# Patient Record
Sex: MALE | Race: WHITE | Employment: FULL TIME | URBAN - METROPOLITAN AREA
[De-identification: names, ages, dates, MRNs, and addresses within clinical notes are randomized per-mention and may not be internally consistent; named-entity substitution may affect disease eponyms.]

---

## 2020-07-14 ENCOUNTER — HOSPITAL ENCOUNTER (EMERGENCY)
Age: 40
Discharge: HOME | End: 2020-07-15
Attending: EMERGENCY MEDICINE
Payer: COMMERCIAL

## 2020-07-14 VITALS
OXYGEN SATURATION: 99 % | RESPIRATION RATE: 16 BRPM | WEIGHT: 195 LBS | SYSTOLIC BLOOD PRESSURE: 145 MMHG | HEART RATE: 82 BPM | HEIGHT: 70 IN | DIASTOLIC BLOOD PRESSURE: 79 MMHG | BODY MASS INDEX: 27.92 KG/M2 | TEMPERATURE: 97.8 F

## 2020-07-14 PROCEDURE — 99283 EMERGENCY DEPT VISIT LOW MDM: CPT

## 2020-07-14 SDOH — HEALTH STABILITY: MENTAL HEALTH: HOW OFTEN DO YOU HAVE A DRINK CONTAINING ALCOHOL?: NEVER

## 2020-07-14 ASSESSMENT — PAIN DESCRIPTION - ORIENTATION: ORIENTATION: LEFT

## 2020-07-14 ASSESSMENT — PAIN DESCRIPTION - LOCATION: LOCATION: KNEE

## 2020-07-14 ASSESSMENT — PAIN SCALES - GENERAL: PAINLEVEL_OUTOF10: 8

## 2020-07-15 ENCOUNTER — APPOINTMENT (OUTPATIENT)
Dept: GENERAL RADIOLOGY | Age: 40
End: 2020-07-15
Payer: COMMERCIAL

## 2020-07-15 PROCEDURE — 6360000002 HC RX W HCPCS: Performed by: PHYSICIAN ASSISTANT

## 2020-07-15 PROCEDURE — 73562 X-RAY EXAM OF KNEE 3: CPT

## 2020-07-15 PROCEDURE — 96372 THER/PROPH/DIAG INJ SC/IM: CPT

## 2020-07-15 RX ORDER — OXYCODONE HYDROCHLORIDE 5 MG/1
5 TABLET ORAL EVERY 6 HOURS PRN
Qty: 10 TABLET | Refills: 0 | Status: SHIPPED | OUTPATIENT
Start: 2020-07-15 | End: 2020-07-18

## 2020-07-15 RX ORDER — KETOROLAC TROMETHAMINE 30 MG/ML
15 INJECTION, SOLUTION INTRAMUSCULAR; INTRAVENOUS ONCE
Status: COMPLETED | OUTPATIENT
Start: 2020-07-15 | End: 2020-07-15

## 2020-07-15 RX ADMIN — KETOROLAC TROMETHAMINE 15 MG: 30 INJECTION, SOLUTION INTRAMUSCULAR at 00:36

## 2020-07-15 ASSESSMENT — PAIN SCALES - GENERAL
PAINLEVEL_OUTOF10: 8
PAINLEVEL_OUTOF10: 5

## 2020-07-15 NOTE — ED TRIAGE NOTES
Pt states that he was taking out the trash and twisted and felt a pop in the left knee. Unable to straighten leg, hx ACL repair 21 years ago.

## 2020-07-16 ENCOUNTER — OFFICE VISIT (OUTPATIENT)
Dept: ORTHOPEDIC SURGERY | Age: 40
End: 2020-07-16
Payer: COMMERCIAL

## 2020-07-16 VITALS — HEIGHT: 70 IN | WEIGHT: 195.11 LBS | BODY MASS INDEX: 27.93 KG/M2 | TEMPERATURE: 97.4 F

## 2020-07-16 PROCEDURE — 99203 OFFICE O/P NEW LOW 30 MIN: CPT | Performed by: ORTHOPAEDIC SURGERY

## 2020-07-16 PROCEDURE — MISC250 COMPRESSION STOCKING: Performed by: ORTHOPAEDIC SURGERY

## 2020-07-16 NOTE — PROGRESS NOTES
Review of Systems   Gastrointestinal:        Hemorrhoids     Musculoskeletal:        Fractures or other injuries    All other systems reviewed and are negative.

## 2020-07-16 NOTE — PROGRESS NOTES
Date:  2020    Name:  Darrell Lange  Address:  [address] [place] [address] [place] [address]    :  1980      Age:   35 y.o.    SSN:  [id number]      Medical Record Number:  <[id number]>    Reason for Visit:    Chief Complaint    New Patient (left knee )      DOS:2020     HPI: Darrell Lange is a 35 y.o. male here today for evaluation of left knee. Patient injured his left knee on Tuesday this past week. He states he was fully around and jumped up into the air and came down wrong on his left knee falling to the ground. He felt a pop in his left knee and had immediate pain and swelling. He went to [facility] ER where x-rays were taken. He was put into a knee immobilizer. He continues to have significant pain and swelling with difficulty extending the knee. The patient does have history of ACL reconstruction using hamstring autograft back in . He denies having any issues with his knee since that surgery. He states his most active activities include golfing and riding his Peloton at home. 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 He denies any paresthesias to his left lower extremity. Pain Assessment  Location of Pain: Knee  Location Modifiers: Left  Severity of Pain: 8  Quality of Pain: Aching  Duration of Pain: A few days  Frequency of Pain: Constant  Aggravating Factors: Other (Comment)(pivoting)  Relieving Factors: Ice, Rest, Nsaids  Result of Injury: Yes  Work-Related Injury: No  Are there other pain locations you wish to document?: No  ROS: Review of systems reviewed from Patient History Form completed today and available in the patient's chart under the Media tab. No past medical history on file. Past Surgical History:   Procedure Laterality Date    ANTERIOR CRUCIATE LIGAMENT REPAIR Left     SHOULDER SURGERY         No family history on file.     Social History     Socioeconomic History    Marital status: Single     Spouse name: None    Number of children: None    Years of education: None    Highest education level: None   Occupational History    None   Social Needs    Financial resource strain: None    Food insecurity     Worry: None     Inability: None    Transportation needs     Medical: None     Non-medical: None   Tobacco Use    Smoking status: Never Smoker    Smokeless tobacco: Never Used   Substance and Sexual Activity    Alcohol use: Never     Frequency: Never    Drug use: Never    Sexual activity: Yes   Lifestyle    Physical activity     Days per week: None     Minutes per session: None    Stress: None   Relationships    Social connections     Talks on phone: None     Gets together: None     Attends [employer] service: None     Active member of club or organization: None     Attends meetings of clubs or organizations: None     Relationship status: None    Intimate partner violence     Fear of current or ex partner: None     Emotionally abused: None     Physically abused: None     Forced sexual activity: None   Other Topics Concern    None   Social History Narrative    None       Current Outpatient Medications   Medication Sig Dispense Refill    oxyCODONE (ROXICODONE) 5 MG immediate release tablet Take 1 tablet by mouth every 6 hours as needed for Pain for up to 3 days. 10 tablet 0     No current facility-administered medications for this visit. No Known Allergies    Vital signs:  Temp 97.4 °F (36.3 °C) (Infrared)   Ht 5' 10\" (1.778 m)   Wt 195 lb 1.7 oz (88.5 kg)   BMI 27.99 kg/m²          Neuro: Alert & oriented x 3,  normal,  no focal deficits noted. Normal affect. Eyes: sclera clear  Ears: Normal external ear  Mouth:  No perioral lesions  Pulm: Respirations unlabored and regular  Pulse: Extremities well perfused. 2+ peripheral pulses. Skin: Warm. No ulcerations. Constitutional: The physical examination finds the patient to be well-developed and well-nourished.   The patient is alert and oriented x3 and was cooperative throughout the visit. Left knee exam    Gait: Patient in knee immobilizer. antalgic gait. Alignment: normal alignment. Inspection/skin: Skin is intact without erythema or ecchymosis. Significant swelling appreciated    Palpation: mild crepitus. Medial joint line tenderness present. Range of Motion: Limited range of motion secondary to pain and swelling with -10 degrees of extension and 40 degrees flexion    Strength: Normal quadriceps development. Effusion: Large effusion present    Ligamentous stability: No collateral ligament instability. Positive Lachman exam with soft endpoint    Neurologic and vascular: Skin is warm and well-perfused. Sensation is intact to light-touch. Special tests: Positive Van sign. Right knee exam    Gait: No use of assistive devices. No antalgic gait. Alignment: normal alignment. Inspection/skin: Skin is intact without erythema or ecchymosis. No gross deformity. Palpation: mild crepitus. no joint line tenderness present. Range of Motion: There is full range of motion. Strength: Normal quadriceps development. Effusion: No effusion or swelling present. Ligamentous stability: No cruciate or collateral ligament instability. Neurologic and vascular: Skin is warm and well-perfused. Sensation is intact to light-touch. Special tests: Negative Van sign. Diagnostics:  Radiology:     X-rays obtained from [facility] previous day include nonweightbearing AP, lateral and merchant views. There is evidence of previous ACL reconstruction with femoral and tibial screws present. No evidence of any acute fracture or malalignment. Assessment: 54-year-old male with acute left knee injury with concern for medial meniscus tear and ACL rupture. Patient has history of left knee ACL reconstruction back in 1995 with hamstring autograft. Plan: Pertinent imaging was reviewed.  The etiology, natural history, and treatment options for the disorder were discussed. The roles of activity medication, antiinflammatories, injections, bracing, physical therapy, and surgical interventions were all described to the patient and questions were answered. The patient is in agreement with this plan. All questions were answered to patient's satisfaction and was encouraged to call with any further questions. We discussed with the patient that we will have him continue to use knee immobilizer and would like him to ambulate with a crutch 50% weightbearing on left lower extremity. He is placed into thigh-high stockings today to help with swelling. He is encouraged to use oral anti-inflammatories as needed for pain control and swelling. We will get an MRI of his left knee to further evaluate the meniscus and ACL. We will see him back within 1 week following the MRI to discuss the results and future treatment plan. Orders Placed This Encounter   Procedures    Compression Stocking $30     Patient was supplied a Compression Sleeve. This retail item was supplied to provide functional support and assist in controlling swelling in the lower extremities. Verbal and written instructions for the use of and application of this item were provided. The patient was educated and fit by a healthcare professional with expert knowledge and specialization in brace application. They were instructed to contact the office immediately should the equipment result in increased pain, decreased sensation, increased swelling or worsening of the condition. Deborah Persaud DO  Clinical Fellow  [address]      The encounter was supervised by Dr Santi Ríos who personally examined the patient and reviewed the plan. This dictation was performed with a verbal recognition program (DRAGON) and it was checked for errors. It is possible that there are still dictated errors within this office note.   If so, please bring any errors to my attention for an addendum. All efforts were made to ensure that this office note is accurate. This dictation was performed with a verbal recognition program (DRAGON) and it was checked for errors. It is possible that there are still dictated errors within this office note. If so, please bring any errors to my attention for an addendum. All efforts were made to ensure that this office note is accurate. Supervising Physician Attestation:  I, Dr. Sabrina Hickey, personally performed the services described in this documentation as scribed above, and it is both accurate and complete and I agree with all pertinent clinical information. I personally interviewed the patient and performed a physical examination and medical decision making. I discussed the patient's condition and treatment options and have  reviewed and agree with the past medical, family and social history unless otherwise noted. All of the patient's questions were answered.       Board Certified Orthopaedic Surgeon  [facility] and [address] [place] [address] and [address] and [employer]  Professor of Vick W Mello Weaver

## 2020-07-21 ENCOUNTER — OFFICE VISIT (OUTPATIENT)
Dept: ORTHOPEDIC SURGERY | Age: 40
End: 2020-07-21
Payer: COMMERCIAL

## 2020-07-21 VITALS — HEIGHT: 70 IN | WEIGHT: 195.11 LBS | BODY MASS INDEX: 27.93 KG/M2 | TEMPERATURE: 98.2 F

## 2020-07-21 PROCEDURE — 99214 OFFICE O/P EST MOD 30 MIN: CPT | Performed by: ORTHOPAEDIC SURGERY

## 2020-07-21 NOTE — PROGRESS NOTES
[facility]  History and Physical  Knee Pain    Date:  2020    Name:  Jose Rees  Address:  [address] [place] [id number]    :  1980      Age:   35 y.o.    SSN:  [id number]      Medical Record Number:  <[id number]>      Chief Complaint    Follow-up (left knee, MRI results )      History of Present Illness:  Jose Rees is a 35 y.o. male who presents for follow-up evaluation of his left knee pain and review of his MRI results. To recap, he states he was fully around over a week ago and jumped up into the air and came down wrong on his left knee falling to the ground. He felt a pop in his left knee and had immediate pain and swelling. He went to [facility] ER where x-rays were taken. He was put into a knee immobilizer. He continues to have significant pain and swelling with difficulty extending the knee. The patient does have history of ACL reconstruction using hamstring autograft back in . Patient denied any issues with his knee prior to this injury. Patient states that he has been utilizing a knee immobilizer and crutches approximately 60% of the time. When not using them he notes increased left knee instability as well as pain in the medial and lateral joint line. Patient desires to return to golfing as well as riding his imbookin ([employer]) stationary bike at home. Otherwise his occupation requires him to sit at a desk for most of the day. The patient denies any new injury since his last visit. The patient denies any numbness, or paresthesias. Pain Assessment  Location of Pain: Knee  Location Modifiers: Left  Severity of Pain: 6  Quality of Pain: Aching  Duration of Pain: A few days  Frequency of Pain: Constant  Aggravating Factors: Other (Comment)(n/a)  Relieving Factors: Ice, Rest, Nsaids  Result of Injury: Yes  Work-Related Injury: No  Are there other pain locations you wish to document?: No    No past medical history on file. Past Surgical History:   Procedure Laterality Date    ANTERIOR CRUCIATE LIGAMENT REPAIR Left     SHOULDER SURGERY         No family history on file. Social History     Socioeconomic History    Marital status: Single     Spouse name: None    Number of children: None    Years of education: None    Highest education level: None   Occupational History    None   Social Needs    Financial resource strain: None    Food insecurity     Worry: None     Inability: None    Transportation needs     Medical: None     Non-medical: None   Tobacco Use    Smoking status: Never Smoker    Smokeless tobacco: Never Used   Substance and Sexual Activity    Alcohol use: Never     Frequency: Never    Drug use: Never    Sexual activity: Yes   Lifestyle    Physical activity     Days per week: None     Minutes per session: None    Stress: None   Relationships    Social connections     Talks on phone: None     Gets together: None     Attends Mandaeism service: None     Active member of club or organization: None     Attends meetings of clubs or organizations: None     Relationship status: None    Intimate partner violence     Fear of current or ex partner: None     Emotionally abused: None     Physically abused: None     Forced sexual activity: None   Other Topics Concern    None   Social History Narrative    None       No current outpatient medications on file. No current facility-administered medications for this visit. No Known Allergies      Review of Systems:  A 14 point review of systems was completed by the patient and is available in the media section of the scanned medical record and was reviewed on 7/21/2020. The review is negative with the exception of those things mentioned in the HPI and Past Medical History   Review of Systems   Musculoskeletal: Positive for arthralgias and myalgias. Neurological: Positive for weakness. Negative for numbness.           Vital Signs:   Temp 98.2 °F (36.8 °C) (Infrared)    5' 10\" (1.778 m)   Wt 195 lb 1.7 oz (88.5 kg)   BMI 27.99 kg/m²     General Exam:    General: Cresencio Cornelius is a healthy and well appearing 35y.o. year old male who is sitting comfortably in our office in no acute distress. Neuro: Alert & Oriented x 3,  normal,  no focal deficits noted. Normal mood & affect. Eyes: Sclera clear  Ears: Normal external ear  Mouth: Patient wearing a mask  Pulm: Respirations unlabored and regular   Skin: Warm, well perfused      Knee Examination: Left    Inspection: Mild quadricep atrophy noted. Mild effusion. No ecchymosis, discoloration, erythema, excessive warmth. no gross deformities, no signs of infection. Palpation: There is patellofemoral crepitus, there is no joint line tenderness. No other osseous or soft tissue tenderness around the knee. Negative calf tenderness    Range of Motion: 0-100 degrees     Strength: Grossly intact    Special Tests:  Valgus and varus stress test are stable at 0 and 30°. Lachman's exhibits a solid endpoint. Grade 2 pivot shift. Negative posterior drawer. Negative Homans sign    Gait: Steady, Non antalgic, with the use of crutches    Alignment: Neutral    Neuro: Sensation equal bilateral lower extremities    Vascular: 2+ posterior tibialis pulse        Radiology:     Narrative    [employer] Re #: [id number] #: [id number] Procedure: MR Left Knee w/o Contrast ; Reason for Exam: ACL and medial meniscus tear    This document is confidential medical information.  Unauthorized disclosure or use of this information is prohibited by law. If you are not the intended recipient of this document, please advise us by calling immediately [phone].         [employer] [address] [place], [place] [address]r Sahil              Patient Name: Cresencio Cornelius    Case ID: [id number]    Patient : 1980    Referring Physician: Domo Coto MD    Exam Date: 2020    Exam Description: MR Left Knee w/o Contrast           HISTORY:  ACL and medial meniscus tear.         TECHNICAL FACTORS:  Long- and short-axis fat- and water-weighted images were performed.         COMPARISON:  None.         FINDINGS:  Thickened inflamed MCL.  Sprain and capsulitis present.         Fibular collateral ligament is intact as is the conjoined tendon and insertion.         Contusion involves the posterolateral tibia and sulcus terminalis of the lateral femoral    condyle.         Lateral meniscus is intact.         ACL graft is torn.         PCL is slightly buckled.  No tear.         Medial meniscus demonstrates abnormal signal involving the outer third of the posterior horn.      This may be related to a healed red-red zone tear or repaired tear.  Recurrent tearing    suspected, and careful inspection and probing may be necessary to exclude a tear in this    region.         Small effusion present.         Scarring within Hoffa fat.         Bone island in the lateral femur.         Small hemarthrosis.         Contusion involves the medial femur as well.  No chondral defect or loose body within the    joint.  Nodular region of signal within the popliteus reflection measuring 6 x 4.5 x 5.5 mm.      Synovial or chondral body present.  Small body also noted along the posterolateral tibia.  This     is likely a sequelae of chronic fracture rather than a region of dystrophic mineralization.      It is best visualized on sagittal T1 image #7.  Chronic tear and scarring inferior    meniscopopliteal fascicle present.  If intervention is performed, correlation and probing in    this region would also be of value.         Subtle synovial thickening is present posteromedially as well.         CONCLUSION:    1.  ACL graft is torn.  Osseous contusion and impaction microfracture involving the    posterolateral tibia and posteromedial tibia is typical of an acute ACL injury.  Small region    of contusion within the medial femur also present.  Small hemarthrosis is also [address] [place] [address]    07/21/20 4:45 PM    During this examination, I, [address], Allison Ardon, functioned as a scribe for Dr. Sina Welch. This dictation was performed with a verbal recognition program (DRAGON) and it was checked for errors. It is possible that there are still dictated errors within this office note. If so, please bring any errors to my attention for an addendum. All efforts were made to ensure that this office note is accurate. This dictation was performed with a verbal recognition program (DRAGON) and it was checked for errors. It is possible that there are still dictated errors within this office note. If so, please bring any errors to my attention for an addendum. All efforts were made to ensure that this office note is accurate. Supervising Physician Attestation:  I, Dr. Sina Welch, personally performed the services described in this documentation as scribed above, and it is both accurate and complete and I agree with all pertinent clinical information. I personally interviewed the patient and performed a physical examination and medical decision making. I discussed the patient's condition and treatment options and have  reviewed and agree with the past medical, family and social history unless otherwise noted. All of the patient's questions were answered.       Board Certified Orthopaedic Surgeon  [facility] and [address]  President and [address] and [employer]  Professor of Mello Bhakta

## 2020-07-23 ASSESSMENT — ENCOUNTER SYMPTOMS
VOMITING: 0
WHEEZING: 0
COLOR CHANGE: 0
COUGH: 0
TROUBLE SWALLOWING: 0
ABDOMINAL PAIN: 0
CHEST TIGHTNESS: 0
RHINORRHEA: 0
ABDOMINAL DISTENTION: 0
NAUSEA: 0
DIARRHEA: 0
EYE PAIN: 0
SHORTNESS OF BREATH: 0
SORE THROAT: 0

## 2020-07-23 NOTE — ED PROVIDER NOTES
distention, abdominal pain, diarrhea, nausea and vomiting. Endocrine: Negative for polydipsia and polyuria. Genitourinary: Negative for dysuria. Musculoskeletal: Negative for myalgias, neck pain and neck stiffness. Left knee pain   Skin: Negative for color change, pallor and rash. Neurological: Negative for dizziness, weakness, light-headedness and headaches. Hematological: Does not bruise/bleed easily. Psychiatric/Behavioral: Negative for confusion, hallucinations, self-injury and suicidal ideas. All other systems reviewed and are negative. Past Medical, Surgical, Family, and Social History     He has no past medical history on file. He has a past surgical history that includes shoulder surgery and Anterior cruciate ligament repair (Left). His family history is not on file. He reports that he has never smoked. He has never used smokeless tobacco. He reports that he does not drink alcohol or use drugs. Medications     Discharge Medication List as of 7/15/2020  1:29 AM          Allergies     He has No Known Allergies. Physical Exam     INITIAL VITALS: BP: (!) 145/79, Temp: 97.8 °F (36.6 °C), Pulse: 82, Resp: 16, SpO2: 99 %  Physical Exam  Vitals signs and nursing note reviewed. Constitutional:       General: He is not in acute distress. Appearance: He is well-developed. He is not diaphoretic. HENT:      Head: Normocephalic and atraumatic. Eyes:      Pupils: Pupils are equal, round, and reactive to light. Neck:      Musculoskeletal: Normal range of motion and neck supple. Vascular: No JVD. Cardiovascular:      Rate and Rhythm: Normal rate and regular rhythm. Pulmonary:      Effort: Pulmonary effort is normal. No respiratory distress. Breath sounds: Normal breath sounds. No wheezing or rales. Chest:      Chest wall: No tenderness. Abdominal:      General: There is no distension. Palpations: Abdomen is soft. Tenderness:  There is no abdominal a 35 y.o. male who presents to the emergency department with a complaint of left knee pain. The patient reports he was taking out the trash, when he stepped awkwardly, and his left knee twisted and he felt a popping sensation. He reports that he immediately went to the ground, but suffered no other injuries from a fall. The patient reports prior ACL surgery to this knee. He was initially concerned for dislocation. Physical examination demonstrates no evidence of knee dislocation or patellar dislocation. Distal pulses are strong. Sensation intact distally. No deformity in the popliteal space. Strong popliteal pulse. No laxity of the LCL or MCL, though the patient is minimally able to tolerate physical examination. He is keeping his knee flexed at 30 degrees from vertical.  X-rays were performed and demonstrate no acute fracture or joint abnormalities. He does demonstrate evidence of remote ACL reconstruction without hardware abnormalities. The joint space is maintained. The patient was administered Toradol in the emergency department. He was placed in a knee immobilizer and given crutches with crutch teaching by the nursing staff. The patient was advised that no fracture was present, however damage to his ACL could not be discerned at this time and the patient may require MRI. The patient reports that he was referred to an orthopedic surgeon by his father, who lives out of town, but has contacts in the region. He reports that he will follow-up with the orthopod tomorrow. A short course of narcotic analgesia was provided for assistance with the patient's symptoms. The patient remained hemodynamically stable throughout his visit to the emergency department. I discussed this plan at length the patient who verbalizes understanding and is in agreement. The patient is currently stable and will be discharged home for continued self-care.   Please see patient's AVS for additional discharge instructions. The patient was seen and evaluated by myself and the physician assistant student, as well as the attending physician, Lenin Harris MD, who agrees with my assessment, treatment and plan. Clinical Impression     1. Left knee injury, initial encounter        Disposition     PATIENT REFERRED TO:  Bebo Bejarano MD  [address] [facility] [place] [address]  [phone]    Schedule an appointment as soon as possible for a visit       [facility] Emergency Department  [address] [place] [address]  Go to   If symptoms worsen      DISCHARGE MEDICATIONS:  Discharge Medication List as of 7/15/2020  1:29 AM      START taking these medications    Details   oxyCODONE (ROXICODONE) 5 MG immediate release tablet Take 1 tablet by mouth every 6 hours as needed for Pain for up to 3 days. , Disp-10 tablet,R-0Print             DISPOSITION Decision To Discharge 07/15/2020 01:36:31 AM       [address] [place] [address] Sahil  07/23/20 0180

## 2020-07-28 ENCOUNTER — HOSPITAL ENCOUNTER (OUTPATIENT)
Dept: PHYSICAL THERAPY | Age: 40
Setting detail: THERAPIES SERIES
Discharge: HOME | End: 2020-07-28
Payer: COMMERCIAL

## 2020-07-28 PROCEDURE — 97110 THERAPEUTIC EXERCISES: CPT

## 2020-07-28 PROCEDURE — 97112 NEUROMUSCULAR REEDUCATION: CPT

## 2020-07-28 PROCEDURE — 97161 PT EVAL LOW COMPLEX 20 MIN: CPT

## 2020-07-28 NOTE — FLOWSHEET NOTE
[address] [facility] [place]    Physical Therapy Daily Treatment Note  Date:  2020    Patient Name:  Beryl Larry    :  1980  MRN: [id number]  Restrictions/Precautions:    Medical/Treatment Diagnosis Information:  · Diagnosis: S83.511A, X04.925K (ICD-10-CM) - Rupture of anterior cruciate ligament of both knees, initial encounter  · Treatment Diagnosis: M25.562 L Knee Pain; M25.362 L Knee Instability; S83.509A Cruciate Injury  Insurance/Certification information:  PT Insurance Information: PT BENEFITS  FACILITY/ [employer]/ EFFECTIVE 20/ ACTIVE/ DED 3250 MET 3250/ PAYS 80%/ OOP 6750 MET 4824.79/ 60 VPCY/ 0 AUTH/ BENEFITS FAX REF# [id number] 20 PAG  Physician Information:  Referring Practitioner: George Ramirez  Has the plan of care been signed (Y/N):        []  Yes  [x]  No     Date of Patient follow up with Physician: 12 weeks / as needed      Is this a Progress Report:     []  Yes  [x]  No        If Yes:  Date Range for reporting period:  Beginning  Ending    Progress report will be due (10 Rx or 30 days whichever is less): 18       Recertification will be due (POC Duration  / 90 days whichever is less): 10/28/20         Visit # Insurance Allowable Auth Required   1  60 VPCY []  Yes [x]  No      OUTCOME MEASURE DATE DEFICIT   LEFS 20 IE 37% Deficit        Patient given satisfaction survey?  [] Yes   [x] No        Latex Allergy:  [x]NO      []YES  Preferred Language for Healthcare:   [x]English       []other:    Pain level:  1-210     SUBJECTIVE:  See eval    OBJECTIVE:        20  Flexibility L R Comment   Hamstring ++ ++ 90/90   Gastroc + +     ITB NT NT Obers   Quad ++ + Elys                20  ROM PROM AROM Overpressure Comment     L R L R L R     Flexion     141 150         Extension     +8 +8                                                20  Strength L R Comment   Quad 4 5 QS   Hamstring 4+ 5     Gastroc         Hip flexion 5 5     Hip abd 4+ 5     Hip Ext 5 5 Poor glute activation                7/28/20  Special Test Results/Comment   McMurrays Neg   Thessaly Neg at 0 and 20 deg flex   Flexion Test Neg   Valgus Laxity Neg   Varus Laxity Neg   Lachmans Pos on L         7/28/20  Girth L R   Mid Patella 39.4 37.3   Suprapatellar 40.2 39.0   5cm above 43.0 42.5   15cm above 53.0 52.3      Reflexes/Sensation:               [x]? Dermatomes/Myotomes intact               [x]? Reflexes equal and normal bilaterally              []?Other:     Joint mobility:                [x]? Normal                       []?Hypo              []?Hyper     Palpation: no TTP at today's visit     Functional Mobility/Transfers: ind     Posture: slight varus     Bandages/Dressings/Incisions: n/a     Gait: (include devices/WB status) WNL     Orthopedic Special Tests: SL balance EO >10\" R and L; consider Y-Balance at a future visit       RESTRICTIONS/PRECAUTIONS: L Knee ACL Deficient     Exercises/Interventions:  Exercise/Equipment Resistance/Repetitions Other comments   Stretching       Hamstring 3 x 30\"    Hip Flexor     ITB     Figure 4     Quad 3 x 30\"    Inclined Calf     Towel Pull 3 x 30\"            ROM       EOB Self-Assist     Sheet Pull     Wall Slide     Manual     Biodex Passive     Recumbent Bike     ERMI     Hang Weights     Ankle Pumps             Patellar Glides       Medial       Superior       Inferior               Isometrics       Quad sets      Add sets              SLR       Supine 3 x 10    Prone NPV Monitor back pain   Abduction 3 x 10    Adducton 3 x 10    SLR+ NPV             Glutes       Bridges NPV    Supine Clams     S/L Clams     Side Stepping       Monster walks               CKC       Weight Shift     Calf raises     Wall sits 5 x 1'    Step ups       Squatting     CC TKE     SL DL               PRE       Extension  RANGE: 90-30   Flexion  RANGE: Avail   Leg Press   RANGE: 80-10   Cable Column               Balance       Tandem Stance     Tilt board       SLS 3 x 20\" Eyes closed   Biodex balance               Other       Treadmill       Gait Training          Manual Interventions          Patient have access to gym? [] Yes  [] No  Patient have equipment at home? [] Yes  [] No     Patient Education:   7/28/20: Patient educated about PT diagnosis, prognosis, and plan of care; educated on role of physical therapy; educated on HEP; educated on anatomy of knee joint; discussed coper versus non-coper for ACL rehab. HEP:  Patient instructed on HEP on this date with handout provided and all questions answered. Patient was instructed to contact PT with any complications or questions about HEP moving forward. Patient verbally stated she understood. Updated 7/28/20  [employer] CODE: [id number]    Therapeutic Exercise and NMR EXR  [x] (49589) Provided verbal/tactile cueing for activities related to strengthening, flexibility, endurance, ROM for improvements in LE, proximal hip, and core control with self care, mobility, lifting, ambulation. [x] (50234) Provided verbal/tactile cueing for activities related to improving balance, coordination, kinesthetic sense, posture, motor skill, proprioception  to assist with LE, proximal hip, and core control in self care, mobility, lifting, ambulation and eccentric single leg control.      NMR and Therapeutic Activities:    [x] (14543 or 99208) Provided verbal/tactile cueing for activities related to improving balance, coordination, kinesthetic sense, posture, motor skill, proprioception and motor activation to allow for proper function of core, proximal hip and LE with self care and ADLs  [] (29546) Gait Re-education- Provided training and instruction to the patient for proper LE, core and proximal hip recruitment and positioning and eccentric body weight control with ambulation re-education including up and down stairs     Home Exercise Program:    [x] (92846) Reviewed/Progressed HEP activities related to strengthening, flexibility, endurance, ROM of core, proximal hip and LE for functional self-care, mobility, lifting and ambulation/stair navigation   [] (31494)Reviewed/Progressed HEP activities related to improving balance, coordination, kinesthetic sense, posture, motor skill, proprioception of core, proximal hip and LE for self care, mobility, lifting, and ambulation/stair navigation      Manual Treatments:  PROM / STM / Oscillations-Mobs:  G-I, II, III, IV (PA's, Inf., Post.)  [x] (32578) Provided manual therapy to mobilize LE, proximal hip and/or LS spine soft tissue/joints for the purpose of modulating pain, promoting relaxation,  increasing ROM, reducing/eliminating soft tissue swelling/inflammation/restriction, improving soft tissue extensibility and allowing for proper ROM for normal function with self care, mobility, lifting and ambulation. Modalities: Declined     Charges:  Timed Code Treatment Minutes: 25   Total Treatment Minutes: 45       [x] EVAL (LOW) 15965 (typically 20 minutes face-to-face)  [] EVAL (MOD) 54286 (typically 30 minutes face-to-face)  [] EVAL (HIGH) 29125 (typically 45 minutes face-to-face)  [] RE-EVAL   [x] Chinese(82116) x  1   [] IONTO  [x] NMR (23088) x 1    [] VASO  [] Manual (66663) x      [] Other:  [] TA x      [] Mech Traction (13821)  [] ES(attended) (93067)      [] ES (un) (56598):     GOALS:   Patient stated goal: Return to Golf    [] Progressing: [] Met: [] Not Met: [] Adjusted    Therapist goals for Patient:   Short Term Goals: To be achieved in: 2 weeks  1. Independent in HEP and progression per patient tolerance, in order to prevent re-injury. [] Progressing: [] Met: [] Not Met: [] Adjusted   2. Patient will have a decrease in pain to facilitate improvement in movement, function, and ADLs as indicated by Functional Deficits. [] Progressing: [] Met: [] Not Met: [] Adjusted    Long Term Goals: To be achieved in: 12 weeks  1.  Disability index score of 18.5% or less for the LEFS to assist with reaching prior level of function. [] Progressing: [] Met: [] Not Met: [] Adjusted  2. Patient will demonstrate increased AROM to 0-150 to allow for proper joint functioning as indicated by patients Functional Deficits. [] Progressing: [] Met: [] Not Met: [] Adjusted  3. Patient will demonstrate an increase in Strength to 5/5 in B LE to allow for proper functional mobility as indicated by patients Functional Deficits. [] Progressing: [] Met: [] Not Met: [] Adjusted  4. Patient will return to all ADL, work and other functional activities without increased symptoms or restriction. [] Progressing: [] Met: [] Not Met: [] Adjusted  5. Patient will pass ISOK testing and hop testing to begin a return to golf program. (patient specific functional goal)    [] Progressing: [] Met: [] Not Met: [] Adjusted     Overall Progression Towards Functional goals/ Treatment Progress Update:  [] Patient is progressing as expected towards functional goals listed. [] Progression is slowed due to complexities/Impairments listed. [] Progression has been slowed due to co-morbidities.   [x] Plan just implemented, too soon to assess goals progression <30days   [] Goals require adjustment due to lack of progress  [] Patient is not progressing as expected and requires additional follow up with physician  [] Other    Prognosis for POC: [x] Good [] Fair  [] Poor      Patient requires continued skilled intervention: [x] Yes  [] No    Treatment/Activity Tolerance:  [x] Patient able to complete treatment  [] Patient limited by fatigue  [] Patient limited by pain     [] Patient limited by other medical complications  [] Other:       PLAN: See eval  [] Continue per plan of care [] Alter current plan (see comments above)  [x] Plan of care initiated [] Hold pending MD visit [] Discharge    Electronically signed by:  Kellie Novak, PT, DPT, OCS    Note: If patient does not return for scheduled/ recommended follow up visits, this note will serve as a discharge from care along with most recent update on progress.

## 2020-07-28 NOTE — PLAN OF CARE
The [address] [place] [address] [address]  [phone]                                                       Physical Therapy Certification    Dear Referring Practitioner: Nuno Robert,    We had the pleasure of evaluating the following patient for physical therapy services at [address] [place] [address]. A summary of our findings can be found in the initial assessment below. This includes our plan of care. If you have any questions or concerns regarding these findings, please do not hesitate to contact me at the office phone number checked above.   Thank you for the referral.       Physician Signature:_______________________________Date:__________________  By signing above (or electronic signature), therapists plan is approved by physician      Patient: Francisca Conner \"JAMA\"  : 1980   MRN: [id number]  Referring Physician: Referring Practitioner: Nuno Robert      Evaluation Date: 2020      Medical Diagnosis Information:  Diagnosis: Q59.884J, V06.220Z (ICD-10-CM) - Rupture of anterior cruciate ligament of both knees, initial encounter   Treatment Diagnosis: M25.562 L Knee Pain; M25.362 L Knee Instability; S83.509A Cruciate Injury                                         Insurance information: PT Insurance Information: PT BENEFITS  FACILITY/ [employer]/ EFFECTIVE 20/ ACTIVE/ DED 3250 MET 3250/ PAYS 80%/ OOP 6750 MET 4824.79/ 60 VPCY/ 0 AUTH/ BENEFITS FAX REF# [id number]/ 20 PAG     Precautions/ Contra-indications: ACL Deficient L Knee    C-SSRS Triggered by Intake questionnaire (Past 2 wk assessment):   [x] No, Questionnaire did not trigger screening.   [] Yes, Patient intake triggered further evaluation      [] C-SSRS Screening completed  [] PCP notified via Plan of Care  [] Emergency services notified     Latex Allergy:  [x]NO      []YES  Preferred Language for Healthcare:   [x]English []other:    SUBJECTIVE: Patient stated complaint: 35year old male presents to PT with left knee pain and injury. 2 weeks ago he was taking garbage out and landed awkwardly on left knee and felt a pop and immediate pain. Pain located along medial side initially but now that has subsided. Today he has lateral knee pain that is intermittent and sharp in nature. (+) stiffness. (-) instability or giving away but he admits he has been laying low and not pushing it. He also has started having low back pain since knee injury that he believes is from favoring the left knee. Hoping to avoid surgery and treat conservatively with PT / rehab.         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    Living Status/Prior Level of Function: Independent with ADLs and IADLs; plays golf and uses [employer] bike for cardio exercise    OBJECTIVE:      7/28/20  Flexibility L R Comment   Hamstring ++ ++ 90/90   Gastroc + +    ITB NT NT Obers   Quad ++ + Elys           7/28/20  ROM PROM AROM Overpressure Comment    L R L R L R    Flexion   141 150      Extension   +8 +8                            7/28/20  Strength L R Comment   Quad 4 5 QS   Hamstring 4+ 5    Gastroc      Hip  flexion 5 5    Hip abd 4+ 5    Hip Ext 5 5 Poor glute activation           7/28/20  Special Test Results/Comment   McMurrays Neg   Thessaly Neg at 0 and 20 deg flex   Flexion Test Neg   Valgus Laxity Neg   Varus Laxity Neg   Lachmans Pos on L       7/28/20  Girth L R   Mid Patella 39.4 37.3   Suprapatellar 40.2 39.0   5cm above 43.0 42.5   15cm above 53.0 52.3 education/learning barriers              []Environmental, home barriers              []profession/work barriers  []past PT/medical experience  [x]other: ACL deficit in left knee  Justification:     Falls Risk Assessment (30 days):   [x] Falls Risk assessed and no intervention required. [] Falls Risk assessed and Patient requires intervention due to being higher risk   TUG score (>12s at risk):     [] Falls education provided, including       G-Codes:     OUTCOME MEASURE DATE DEFICIT   LEFS 7/28/20 IE 37% Deficit          ASSESSMENT:   Functional Impairments:     []Noted lumbar/proximal hip/LE hypomobility   []Decreased LE functional ROM   [x]Decreased core/proximal hip strength and neuromuscular control   [x]Decreased LE functional strength   [x]Reduced balance/proprioceptive control   []other:      Functional Activity Limitations (from functional questionnaire and intake)   []Reduced ability to tolerate prolonged functional positions   []Reduced ability or difficulty with changes of positions or transfers between positions   []Reduced ability to maintain good posture and demonstrate good body mechanics with sitting, bending, and lifting   []Reduced ability to sleep   [] Reduced ability or tolerance with driving and/or computer work   [x]Reduced ability to perform lifting, carrying tasks   [x]Reduced ability to squat   []Reduced ability to forward bend   []Reduced ability to ambulate prolonged functional periods/distances/surfaces   [x]Reduced ability to ascend/descend stairs   [x]Reduced ability to run, hop or jump   []other:     Participation Restrictions   []Reduced participation in self care activities   []Reduced participation in home management activities   []Reduced participation in work activities   [x]Reduced participation in social activities. [x]Reduced participation in sport activities.     Classification :    []Signs/symptoms consistent with post-surgical status including decreased ROM, strength and strength training, ROM, for Lower extremity and core   [x]  NMR activation and proprioception for LE, Glutes and Core   [x]  Manual therapy as indicated for LE, Hip and spine to include: Dry Needling/IASTM, STM, PROM, Gr I-IV mobilizations, manipulation. [x] Modalities as needed that may include: thermal agents, E-stim, Biofeedback, US, iontophoresis as indicated  [x] Patient education on joint protection, postural re-education, activity modification, progression of HEP. HEP instruction:   Patient instructed on HEP on this date with handout provided and all questions answered. Patient was instructed to contact PT with any complications or questions about HEP moving forward. Patient verbally stated she understood. [address]: [id number] (see scanned forms)    GOALS:   Patient stated goal: Return to Golf    [] Progressing: [] Met: [] Not Met: [] Adjusted    Therapist goals for Patient:   Short Term Goals: To be achieved in: 2 weeks  1. Independent in HEP and progression per patient tolerance, in order to prevent re-injury. [] Progressing: [] Met: [] Not Met: [] Adjusted   2. Patient will have a decrease in pain to facilitate improvement in movement, function, and ADLs as indicated by Functional Deficits. [] Progressing: [] Met: [] Not Met: [] Adjusted    Long Term Goals: To be achieved in: 12 weeks  1. Disability index score of 18.5% or less for the LEFS to assist with reaching prior level of function. [] Progressing: [] Met: [] Not Met: [] Adjusted  2. Patient will demonstrate increased AROM to 0-150 to allow for proper joint functioning as indicated by patients Functional Deficits. [] Progressing: [] Met: [] Not Met: [] Adjusted  3. Patient will demonstrate an increase in Strength to 5/5 in B LE to allow for proper functional mobility as indicated by patients Functional Deficits. [] Progressing: [] Met: [] Not Met: [] Adjusted  4.  Patient will return to all ADL, work and other functional activities without increased symptoms or restriction. [] Progressing: [] Met: [] Not Met: [] Adjusted  5. Patient will pass ISOK testing and hop testing to begin a return to golf program. (patient specific functional goal)    [] Progressing: [] Met: [] Not Met: [] Adjusted       Electronically signed by:  Dorothy Young, PT, DPT, OCS    Note: If patient does not return for scheduled/ recommended follow up visits, this note will serve as a discharge from care along with most recent update on progress.